# Patient Record
Sex: MALE | Race: BLACK OR AFRICAN AMERICAN | NOT HISPANIC OR LATINO | URBAN - METROPOLITAN AREA
[De-identification: names, ages, dates, MRNs, and addresses within clinical notes are randomized per-mention and may not be internally consistent; named-entity substitution may affect disease eponyms.]

---

## 2017-06-14 ENCOUNTER — EMERGENCY (EMERGENCY)
Facility: HOSPITAL | Age: 37
LOS: 1 days | Discharge: HOME | End: 2017-06-14
Attending: EMERGENCY MEDICINE
Payer: MEDICAID

## 2017-06-14 VITALS
WEIGHT: 250 LBS | TEMPERATURE: 98 F | RESPIRATION RATE: 20 BRPM | HEIGHT: 70 IN | HEART RATE: 82 BPM | OXYGEN SATURATION: 99 %

## 2017-06-14 PROCEDURE — 10061 I&D ABSCESS COMP/MULTIPLE: CPT

## 2017-06-14 PROCEDURE — 99283 EMERGENCY DEPT VISIT LOW MDM: CPT | Mod: 25

## 2017-06-15 RX ORDER — AZTREONAM 2 G
1 VIAL (EA) INJECTION
Qty: 20 | Refills: 0 | OUTPATIENT
Start: 2017-06-15 | End: 2017-06-25

## 2017-06-15 NOTE — ED PROVIDER NOTE - MEDICAL DECISION MAKING DETAILS
36 yr old M presented to ED with abscess to right gluteal region. I& D performed and packing placed. Pt will F/U on Saturday for packing removal. Pt D/C with Rx Bactrim DS x 10 days

## 2017-06-15 NOTE — ED PROVIDER NOTE - OBJECTIVE STATEMENT
36 yr old M presented to ED with abscess to right gluteal region. Pt stated that he noticed the abscess x 5 days ago and it kept getting bigger and not it got so painful to permit sitting. Pt denies fever or chills but admits to pain. Pt also admits to history of previous abscess to right gluteal region. Pt denies any significant past medical or surgical history.

## 2017-06-15 NOTE — ED PROVIDER NOTE - CARE PLAN
Principal Discharge DX:	Abscess Principal Discharge DX:	Abscess  Instructions for follow-up, activity and diet:	Return to ED for packing removal on Saturday. Continue with Medication as discussed.

## 2017-06-15 NOTE — ED PROCEDURE NOTE - PROCEDURE ADDITIONAL DETAILS
Abscess to Right medial gluteal region. Abscess cleaned with Betadine and Lidocaine 1 % 4ml  I&D performed .

## 2017-06-15 NOTE — ED PROVIDER NOTE - PROGRESS NOTE DETAILS
Pt treated with Percocet prior to I &D . Pt tolerated medication well. Pt says he is I so much pain and wants to wait for I& D.

## 2017-06-15 NOTE — ED PROVIDER NOTE - ATTENDING CONTRIBUTION TO CARE
35 yo male with abscess to rt gluteal area 5 days ago no fever or chills  nontoxic appearing  + abscess needs I&D  agree with pa assessment and plan

## 2018-05-12 NOTE — ED PROVIDER NOTE - CROS ED CONS ALL NEG
52M presenting with tooth #2 pain. Benign exam with mild sensitivity on exam. No signs of abscess or severe infection. Will provide symptomatic control and dental referral negative... 52M presenting with tooth #2 pain. Benign exam with mild sensitivity on exam. No signs of abscess or severe infection. Will provide symptomatic control and dental referral  Grace: 2nd tooth, no fever, no swelling, nl jaw motion. has dental follow up. treat pain

## 2023-02-22 NOTE — ED ADULT TRIAGE NOTE - WEIGHT IN KG
Routing refill request to provider for review/approval because:  PCP to determine refill    Mandeep Madison RN         [phone]